# Patient Record
(demographics unavailable — no encounter records)

---

## 2025-01-25 NOTE — HISTORY OF PRESENT ILLNESS
[FreeTextEntry1] : Isidro Menendez returns to the office today for follow-up on kidney cancer.  He is now 67 years old and is status post a right partial nephrectomy performed in 2020.  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  His last imaging assessment was performed in January 2024 showing a simple cyst at the lower pole of the left kidney and postsurgical changes around the right kidney consistent with his prior partial nephrectomy.  No evidence of any solid lesions were seen.  There was no stone disease or hydronephrosis.

## 2025-01-25 NOTE — ASSESSMENT
[FreeTextEntry1] : Ultrasound reassessment today continues to show the absence of any disease recurrence.  There are no solid mass lesions present in either kidney.  He also does not have any stones visualized and there is no hydronephrosis.  He has a left lower pole renal cyst which today measures 1.9 cm.  This remains small and stable without any changes and with no features to suggest malignancy.  I will continue to follow him with a renal ultrasound but it is not anticipated he should need any additional treatment for kidney cancer.  The patient recently had his PSA level checked in October 2024.  The PSA was 0.2 suggesting extremely low risk for him harboring clinically significant prostate cancer.  Screening can be continued but I do not think there is any other workup needed at this point.  PSA again in 1 to 2 years.

## 2025-01-25 NOTE — LETTER BODY
[Dear  ___] : Dear  [unfilled], [Courtesy Letter:] : I had the pleasure of seeing your patient, [unfilled], in my office today. [Please see my note below.] : Please see my note below. [Consult Closing:] : Thank you very much for allowing me to participate in the care of this patient.  If you have any questions, please do not hesitate to contact me. [FreeTextEntry2] : Yoan Rojo M.D. [facility], [address] [place] [address] [FreeTextEntry3] : Sincerely,      Peter Mobley MD, FACS Director of Urology Services, [facility] Chief of Urology, [facility]  of Urology   [facility], [place] [id number] P: [phone] F: [phone] [facility]

## 2025-01-25 NOTE — LETTER BODY
[Dear  ___] : Dear  [unfilled], [Courtesy Letter:] : I had the pleasure of seeing your patient, [unfilled], in my office today. [Please see my note below.] : Please see my note below. [Consult Closing:] : Thank you very much for allowing me to participate in the care of this patient.  If you have any questions, please do not hesitate to contact me. [FreeTextEntry2] : Yoan Rojo M.D. [facility], [address] [place] [address] [FreeTextEntry3] : Sincerely,      Peter Mobley MD, FACS Director of Urology Services, [facility] Chief of Urology, [facility]  of Urology   [facility], [place] [id number] P: [phone] F: [phone] [facility]